# Patient Record
Sex: FEMALE | Race: WHITE | URBAN - METROPOLITAN AREA
[De-identification: names, ages, dates, MRNs, and addresses within clinical notes are randomized per-mention and may not be internally consistent; named-entity substitution may affect disease eponyms.]

---

## 2021-07-13 ENCOUNTER — OFFICE VISIT (OUTPATIENT)
Dept: URBAN - METROPOLITAN AREA CLINIC 23 | Facility: CLINIC | Age: 56
End: 2021-07-13

## 2021-07-13 ENCOUNTER — OFFICE VISIT (OUTPATIENT)
Dept: URBAN - METROPOLITAN AREA CLINIC 23 | Facility: CLINIC | Age: 56
End: 2021-07-13
Payer: COMMERCIAL

## 2021-07-13 ENCOUNTER — WEB ENCOUNTER (OUTPATIENT)
Dept: URBAN - METROPOLITAN AREA CLINIC 23 | Facility: CLINIC | Age: 56
End: 2021-07-13

## 2021-07-13 DIAGNOSIS — R12 HEARTBURN: ICD-10-CM

## 2021-07-13 DIAGNOSIS — K58.1 IRRITABLE BOWEL SYNDROME WITH CONSTIPATION: ICD-10-CM

## 2021-07-13 PROCEDURE — G9622 NO UNHEAL ETOH USER: HCPCS | Performed by: INTERNAL MEDICINE

## 2021-07-13 PROCEDURE — 3017F COLORECTAL CA SCREEN DOC REV: CPT | Performed by: INTERNAL MEDICINE

## 2021-07-13 PROCEDURE — 1036F TOBACCO NON-USER: CPT | Performed by: INTERNAL MEDICINE

## 2021-07-13 PROCEDURE — G8420 CALC BMI NORM PARAMETERS: HCPCS | Performed by: INTERNAL MEDICINE

## 2021-07-13 PROCEDURE — G9903 PT SCRN TBCO ID AS NON USER: HCPCS | Performed by: INTERNAL MEDICINE

## 2021-07-13 PROCEDURE — 99244 OFF/OP CNSLTJ NEW/EST MOD 40: CPT | Performed by: INTERNAL MEDICINE

## 2021-07-13 PROCEDURE — G8427 DOCREV CUR MEDS BY ELIG CLIN: HCPCS | Performed by: INTERNAL MEDICINE

## 2021-07-13 RX ORDER — ESOMEPRAZOLE MAGNESIUM 40 MG
CAPSULE,DELAYED RELEASE (ENTERIC COATED) ORAL
Qty: 0 | Refills: 0 | Status: ON HOLD | COMMUNITY
Start: 1900-01-01

## 2021-07-13 RX ORDER — DICYCLOMINE HYDROCHLORIDE 20 MG/1
TABLET ORAL
Qty: 0 | Refills: 0 | Status: ON HOLD | COMMUNITY
Start: 1900-01-01

## 2021-07-13 NOTE — PREVIOUS WORKUP REVIEWED
.ENDOSCOPIES-Colonoscopy 8/22/2018: A 4 mm polyp in the transverse colon.  Internal hemorrhoids.  Repeat colonoscopy in 5 years.-EGD 8/22/2018: Normal esophagus.  Mild gastropathy in the body.  Normal duodenum.*Pathology: Stomach-slight chronic inflammation, no H. pylori.  Distal esophagus-normal.  Proximal esophagus-normal.  Transverse colon polyp-benign colonic mucosa.-EGD 8/14/2015: Mild gastritis in the antrum and body.  Otherwise normal.*Pathology: Stomach-mild chronic gastritis, no H. pylori.  Random colon-normal. LABSIMAGES-CT abdomen pelvis with contrast 7/5/2018: Normal.  Status post cholecystectomy.  Postsurgical changes in the rectum.-CT abdomen pelvis with contrast 9/18/2015: Cholecystectomy.  Otherwise normal.

## 2021-07-13 NOTE — HPI-TODAY'S VISIT:
This patient was referred by Dr. Karen Champion for evaluation of chronic abdominal pain. The copy of this note will be sent to the referring provider. 56-year-old  female for chronic right-sided abdominal pain.  She had extensive work-up including EGD, colonoscopy, CT scan, all unremarkable.  Trial of PPI did not help.  Sharp pain/burning/intermittent.  Every day.  Worse at night.  Gets worse after meals.  No blood in stool.  In the past that she had episode of blood in stool red-black.  Denies NSAID use.  Intentional weight loss, 6 pounds, hard to lose weight.  Bowel movements 1 almost daily, or every other day.  Excessive straining.  [place] Stool Scale type III-IV.  She had cholecystectomy for the pain, did not help.   She also reports frequent heartburn.

## 2021-08-03 ENCOUNTER — OFFICE VISIT (OUTPATIENT)
Dept: URBAN - METROPOLITAN AREA CLINIC 23 | Facility: CLINIC | Age: 56
End: 2021-08-03
Payer: COMMERCIAL

## 2021-08-03 DIAGNOSIS — R12 HEARTBURN: ICD-10-CM

## 2021-08-03 DIAGNOSIS — K58.1 IRRITABLE BOWEL SYNDROME WITH CONSTIPATION: ICD-10-CM

## 2021-08-03 PROCEDURE — 3017F COLORECTAL CA SCREEN DOC REV: CPT | Performed by: INTERNAL MEDICINE

## 2021-08-03 PROCEDURE — 1036F TOBACCO NON-USER: CPT | Performed by: INTERNAL MEDICINE

## 2021-08-03 PROCEDURE — G9622 NO UNHEAL ETOH USER: HCPCS | Performed by: INTERNAL MEDICINE

## 2021-08-03 PROCEDURE — G8427 DOCREV CUR MEDS BY ELIG CLIN: HCPCS | Performed by: INTERNAL MEDICINE

## 2021-08-03 PROCEDURE — G9903 PT SCRN TBCO ID AS NON USER: HCPCS | Performed by: INTERNAL MEDICINE

## 2021-08-03 PROCEDURE — 99214 OFFICE O/P EST MOD 30 MIN: CPT | Performed by: INTERNAL MEDICINE

## 2021-08-03 PROCEDURE — G8420 CALC BMI NORM PARAMETERS: HCPCS | Performed by: INTERNAL MEDICINE

## 2021-08-03 RX ORDER — ESOMEPRAZOLE MAGNESIUM 40 MG/1
1 CAPSULE BEFORE MEAL CAPSULE, DELAYED RELEASE ORAL
Qty: 90 | Refills: 1 | OUTPATIENT
Start: 2021-08-03

## 2021-08-03 RX ORDER — DICYCLOMINE HYDROCHLORIDE 20 MG/1
1 TABLET TABLET ORAL
Qty: 90 | Refills: 3 | OUTPATIENT
Start: 2021-08-03 | End: 2021-11-30

## 2021-08-03 NOTE — HPI-TODAY'S VISIT:
56-year-old  female presents for follow-up of IBS-C and heartburn.  At the last visit I asked her to take Metamucil and IBgard.  It has not helped.  She still has pain all over the abdomen, mostly in the right side.  Constant pain.  Moves bowel for days per week.  [place] Stool Scale type 4, 5.  Sometimes type I.   She has been taking omeprazole for heartburn, which has not helped at all either.

## 2021-08-03 NOTE — PREVIOUS WORKUP REVIEWED
. , .ENDOSCOPIES-Colonoscopy 8/22/2018: A 4 mm polyp in the transverse colon.  Internal hemorrhoids.  Repeat colonoscopy in 5 years.-EGD 8/22/2018: Normal esophagus.  Mild gastropathy in the body.  Normal duodenum.*Pathology: Stomach-slight chronic inflammation, no H. pylori.  Distal esophagus-normal.  Proximal esophagus-normal.  Transverse colon polyp-benign colonic mucosa.-EGD 8/14/2015: Mild gastritis in the antrum and body.  Otherwise normal.*Pathology: Stomach-mild chronic gastritis, no H. pylori.  Random colon-normal. LABSIMAGES-CT abdomen pelvis with contrast 7/5/2018: Normal.  Status post cholecystectomy.  Postsurgical changes in the rectum.-CT abdomen pelvis with contrast 9/18/2015: Cholecystectomy.  Otherwise normal.

## 2021-08-04 LAB
A/G RATIO: 1.8
ALBUMIN: 4.3
ALKALINE PHOSPHATASE: 114
ALT (SGPT): 21
AST (SGOT): 19
BILIRUBIN, TOTAL: 0.2
BUN/CREATININE RATIO: 18
BUN: 12
CALCIUM: 8.8
CARBON DIOXIDE, TOTAL: 24
CHLORIDE: 105
CREATININE: 0.65
EGFR IF AFRICN AM: 116
EGFR IF NONAFRICN AM: 100
GLOBULIN, TOTAL: 2.4
GLUCOSE: 107
HEMATOCRIT: 38.9
HEMOGLOBIN: 13.1
LIPASE: 44
MCH: 31
MCHC: 33.7
MCV: 92
NRBC: (no result)
PLATELETS: 209
POTASSIUM: 4
PROTEIN, TOTAL: 6.7
RBC: 4.23
RDW: 12.8
SODIUM: 141
WBC: 7.3

## 2021-08-31 ENCOUNTER — OFFICE VISIT (OUTPATIENT)
Dept: URBAN - METROPOLITAN AREA CLINIC 23 | Facility: CLINIC | Age: 56
End: 2021-08-31

## 2021-09-28 ENCOUNTER — LAB OUTSIDE AN ENCOUNTER (OUTPATIENT)
Dept: URBAN - METROPOLITAN AREA CLINIC 23 | Facility: CLINIC | Age: 56
End: 2021-09-28

## 2021-09-28 ENCOUNTER — OFFICE VISIT (OUTPATIENT)
Dept: URBAN - METROPOLITAN AREA CLINIC 23 | Facility: CLINIC | Age: 56
End: 2021-09-28
Payer: COMMERCIAL

## 2021-09-28 DIAGNOSIS — R12 HEARTBURN: ICD-10-CM

## 2021-09-28 DIAGNOSIS — K58.1 IRRITABLE BOWEL SYNDROME WITH CONSTIPATION: ICD-10-CM

## 2021-09-28 DIAGNOSIS — R10.31 RLQ ABDOMINAL PAIN: ICD-10-CM

## 2021-09-28 PROCEDURE — G8420 CALC BMI NORM PARAMETERS: HCPCS | Performed by: INTERNAL MEDICINE

## 2021-09-28 PROCEDURE — 1036F TOBACCO NON-USER: CPT | Performed by: INTERNAL MEDICINE

## 2021-09-28 PROCEDURE — 99214 OFFICE O/P EST MOD 30 MIN: CPT | Performed by: INTERNAL MEDICINE

## 2021-09-28 PROCEDURE — G8427 DOCREV CUR MEDS BY ELIG CLIN: HCPCS | Performed by: INTERNAL MEDICINE

## 2021-09-28 PROCEDURE — 3017F COLORECTAL CA SCREEN DOC REV: CPT | Performed by: INTERNAL MEDICINE

## 2021-09-28 PROCEDURE — G9622 NO UNHEAL ETOH USER: HCPCS | Performed by: INTERNAL MEDICINE

## 2021-09-28 RX ORDER — CITALOPRAM 10 MG/1
1 TABLET TABLET, FILM COATED ORAL ONCE A DAY
Qty: 30 | Refills: 1 | OUTPATIENT
Start: 2021-09-28

## 2021-09-28 NOTE — HPI-TODAY'S VISIT:
56-year-old  female [place]-speaking only presents for follow-up of chronic heartburn and right side abdominal pain.  At the last visit we tried Nexium, dicyclomine, MiraLAX, Linzess.  Her symptom did get better but worse.  So far she tried Nexium, pantoprazole omeprazole, none of them worked. Denies NSAIDs use. No dysphagia.  Dicyclomine caused dizziness and headache.  She stopped taking it after 1 month.  She used MiraLAX 20 days, it helped with bowel movements.  However she moves bowels daily 1-2 bowel movements without MiraLAX 2.  She stopped it after 20 days use. Currently [place] Stool Scale type IV. Linzess caused looser stools.   She reports significant mestrural pain when she was young.

## 2021-09-28 NOTE — PREVIOUS WORKUP REVIEWED
.ENDOSCOPIES-Colonoscopy 8/22/2018: A 4 mm polyp in the transverse colon. Internal hemorrhoids. Repeat colonoscopy in 5 years.-EGD 8/22/2018: Normal esophagus. Mild gastropathy in the body. Normal duodenum.*Pathology: Stomach-slight chronic inflammation, no H. pylori. Distal esophagus-normal. Proximal esophagus-normal. Transverse colon polyp-benign colonic mucosa.-EGD 8/14/2015: Mild gastritis in the antrum and body. Otherwise normal.*Pathology: Stomach-mild chronic gastritis, no H. pylori. Random colon-normal. LABS-Labs 8/3/2021: Lipase 44, BUN 12, creatinine 0.65, total bilirubin 0.2, alkaline phosphatase 114, AST 19, ALT 21, WBC 7.3, hemoglobin 13.1, platelets 209.IMAGES-CT abdomen pelvis with contrast 7/5/2018: Normal. Status post cholecystectomy. Postsurgical changes in the rectum.-CT abdomen pelvis with contrast 9/18/2015: Cholecystectomy. Otherwise normal.IBS SYNOPSIS Chronic heartburn: Nexium, Protonix, Prilosec (did not help). IBS-C: Miralax (works). Linzess 290mcg (diarrhea). Dicyclomine (dizzy and headache). IBGard, metamucil (did not help).

## 2021-11-10 ENCOUNTER — TELEPHONE ENCOUNTER (OUTPATIENT)
Dept: URBAN - METROPOLITAN AREA CLINIC 23 | Facility: CLINIC | Age: 56
End: 2021-11-10

## 2021-11-12 ENCOUNTER — OFFICE VISIT (OUTPATIENT)
Dept: URBAN - METROPOLITAN AREA SURGERY CENTER 15 | Facility: SURGERY CENTER | Age: 56
End: 2021-11-12
Payer: COMMERCIAL

## 2021-11-12 ENCOUNTER — CLAIMS CREATED FROM THE CLAIM WINDOW (OUTPATIENT)
Dept: URBAN - METROPOLITAN AREA CLINIC 4 | Facility: CLINIC | Age: 56
End: 2021-11-12
Payer: COMMERCIAL

## 2021-11-12 ENCOUNTER — LAB OUTSIDE AN ENCOUNTER (OUTPATIENT)
Dept: URBAN - METROPOLITAN AREA CLINIC 23 | Facility: CLINIC | Age: 56
End: 2021-11-12

## 2021-11-12 ENCOUNTER — TELEPHONE ENCOUNTER (OUTPATIENT)
Dept: URBAN - METROPOLITAN AREA CLINIC 23 | Facility: CLINIC | Age: 56
End: 2021-11-12

## 2021-11-12 DIAGNOSIS — K31.89 DEFORMED PYLORUS, ACQUIRED: ICD-10-CM

## 2021-11-12 DIAGNOSIS — R12 BURNING REFLUX: ICD-10-CM

## 2021-11-12 PROCEDURE — 43239 EGD BIOPSY SINGLE/MULTIPLE: CPT | Performed by: INTERNAL MEDICINE

## 2021-11-12 PROCEDURE — G8907 PT DOC NO EVENTS ON DISCHARG: HCPCS | Performed by: INTERNAL MEDICINE

## 2021-11-12 PROCEDURE — 88342 IMHCHEM/IMCYTCHM 1ST ANTB: CPT | Performed by: PATHOLOGY

## 2021-11-12 PROCEDURE — 88305 TISSUE EXAM BY PATHOLOGIST: CPT | Performed by: PATHOLOGY

## 2021-11-12 RX ORDER — CITALOPRAM 10 MG/1
1 TABLET TABLET, FILM COATED ORAL ONCE A DAY
Qty: 30 | Refills: 1 | Status: ACTIVE | COMMUNITY
Start: 2021-09-28

## 2021-11-15 ENCOUNTER — OFFICE VISIT (OUTPATIENT)
Dept: URBAN - METROPOLITAN AREA CLINIC 78 | Facility: CLINIC | Age: 56
End: 2021-11-15

## 2021-12-20 ENCOUNTER — OFFICE VISIT (OUTPATIENT)
Dept: URBAN - METROPOLITAN AREA MEDICAL CENTER 27 | Facility: MEDICAL CENTER | Age: 56
End: 2021-12-20
Payer: COMMERCIAL

## 2021-12-20 DIAGNOSIS — R12 BURNING REFLUX: ICD-10-CM

## 2021-12-20 DIAGNOSIS — K29.60 ADENOPAPILLOMATOSIS GASTRICA: ICD-10-CM

## 2021-12-20 PROCEDURE — 91035 G-ESOPH REFLX TST W/ELECTROD: CPT | Performed by: INTERNAL MEDICINE

## 2021-12-20 PROCEDURE — 43239 EGD BIOPSY SINGLE/MULTIPLE: CPT | Performed by: INTERNAL MEDICINE

## 2021-12-20 RX ORDER — CITALOPRAM 10 MG/1
1 TABLET TABLET, FILM COATED ORAL ONCE A DAY
Qty: 30 | Refills: 1 | Status: ACTIVE | COMMUNITY
Start: 2021-09-28

## 2021-12-27 ENCOUNTER — TELEPHONE ENCOUNTER (OUTPATIENT)
Dept: URBAN - METROPOLITAN AREA CLINIC 23 | Facility: CLINIC | Age: 56
End: 2021-12-27

## 2021-12-27 RX ORDER — CITALOPRAM 10 MG/1
1 TABLET TABLET, FILM COATED ORAL ONCE A DAY
Qty: 90 TABLET | Refills: 1
Start: 2021-09-28

## 2022-01-25 ENCOUNTER — DASHBOARD ENCOUNTERS (OUTPATIENT)
Age: 57
End: 2022-01-25

## 2022-01-25 ENCOUNTER — OFFICE VISIT (OUTPATIENT)
Dept: URBAN - METROPOLITAN AREA CLINIC 23 | Facility: CLINIC | Age: 57
End: 2022-01-25
Payer: COMMERCIAL

## 2022-01-25 DIAGNOSIS — R12 FUNCTIONAL HEARTBURN: ICD-10-CM

## 2022-01-25 DIAGNOSIS — K58.1 IRRITABLE BOWEL SYNDROME WITH CONSTIPATION: ICD-10-CM

## 2022-01-25 PROBLEM — 440630006: Status: ACTIVE | Noted: 2021-07-13

## 2022-01-25 PROCEDURE — G9622 NO UNHEAL ETOH USER: HCPCS | Performed by: INTERNAL MEDICINE

## 2022-01-25 PROCEDURE — G9903 PT SCRN TBCO ID AS NON USER: HCPCS | Performed by: INTERNAL MEDICINE

## 2022-01-25 PROCEDURE — G8420 CALC BMI NORM PARAMETERS: HCPCS | Performed by: INTERNAL MEDICINE

## 2022-01-25 PROCEDURE — 99214 OFFICE O/P EST MOD 30 MIN: CPT | Performed by: INTERNAL MEDICINE

## 2022-01-25 PROCEDURE — 3017F COLORECTAL CA SCREEN DOC REV: CPT | Performed by: INTERNAL MEDICINE

## 2022-01-25 PROCEDURE — 1036F TOBACCO NON-USER: CPT | Performed by: INTERNAL MEDICINE

## 2022-01-25 PROCEDURE — G8427 DOCREV CUR MEDS BY ELIG CLIN: HCPCS | Performed by: INTERNAL MEDICINE

## 2022-01-25 RX ORDER — CITALOPRAM 20 MG/1
1 TABLET TABLET, FILM COATED ORAL ONCE A DAY
Qty: 90 TABLET | Refills: 3
Start: 2021-09-28

## 2022-01-25 RX ORDER — CITALOPRAM 10 MG/1
1 TABLET TABLET, FILM COATED ORAL ONCE A DAY
Qty: 90 TABLET | Refills: 1 | Status: ACTIVE | COMMUNITY
Start: 2021-09-28

## 2022-01-25 NOTE — HPI-TODAY'S VISIT:
56-year-old female presents for follow-up of chronic right-sided abdominal pain, heartburn, acidic taste in the mouth.  She had EGD with Bravo test, unremarkable, negative for reflux disease. She has been taking Celexa 10 mg daily.  Abdominal pain and burning sensation have gotten better.  She wants to try 20 mg this time. She also reports change in taste, acidic.  Postnasal drip.

## 2022-01-25 NOTE — PREVIOUS WORKUP REVIEWED
.ENDOSCOPIES-EGD 12/20/2021: Normal EGD.*Pathology: Duodenum-normal. Gastric random-chronic active gastritis. No H. pylori infection. Distal esophagus-normal. Proximal esophagus-normal.-Colonoscopy 8/22/2018: A 4 mm polyp in the transverse colon. Internal hemorrhoids. Repeat colonoscopy in 5 years.-EGD 8/22/2018: Normal esophagus. Mild gastropathy in the body. Normal duodenum.*Pathology: Stomach-slight chronic inflammation, no H. pylori. Distal esophagus-normal. Proximal esophagus-normal. Transverse colon polyp-benign colonic mucosa.-EGD 8/14/2015: Mild gastritis in the antrum and body. Otherwise normal.*Pathology: Stomach-mild chronic gastritis, no H. pylori. Random colon-normal. LABS-Labs 8/3/2021: Lipase 44, BUN 12, creatinine 0.65, total bilirubin 0.2, alkaline phosphatase 114, AST 19, ALT 21, WBC 7.3, hemoglobin 13.1, platelets 209.IMAGES-CT abdomen pelvis with contrast 7/5/2018: Normal. Status post cholecystectomy. Postsurgical changes in the rectum.-CT abdomen pelvis with contrast 9/18/2015: Cholecystectomy. Otherwise normal.GI TESTS -Bravo test 12/20/2021: Normal.IBS SYNOPSIS Chronic heartburn: Nexium, Protonix, Prilosec (did not help).IBS-C: Miralax (works). Linzess 290mcg (diarrhea). Dicyclomine (dizzy and headache). IBGard, metamucil (did not help).

## 2022-07-26 ENCOUNTER — OFFICE VISIT (OUTPATIENT)
Dept: URBAN - METROPOLITAN AREA CLINIC 23 | Facility: CLINIC | Age: 57
End: 2022-07-26

## 2022-07-29 ENCOUNTER — OFFICE VISIT (OUTPATIENT)
Dept: URBAN - METROPOLITAN AREA CLINIC 23 | Facility: CLINIC | Age: 57
End: 2022-07-29

## 2024-09-13 NOTE — PHYSICAL EXAM HENT:
Head- normocephalic, atraumatic, Face- Face within normal limits, Ears- External ears within normal limits, Nose/Nasopharynx- External nose normal appearance, nares patent, no nasal discharge,  Mouth and Throat- Oral cavity appearance normal, Lips- Appearance normal - - -